# Patient Record
Sex: FEMALE | Race: WHITE
[De-identification: names, ages, dates, MRNs, and addresses within clinical notes are randomized per-mention and may not be internally consistent; named-entity substitution may affect disease eponyms.]

---

## 2018-01-23 ENCOUNTER — HOSPITAL ENCOUNTER (OUTPATIENT)
Dept: HOSPITAL 17 - HRAD | Age: 83
End: 2018-01-23
Payer: MEDICARE

## 2018-01-23 DIAGNOSIS — G21.9: Primary | ICD-10-CM

## 2018-01-23 PROCEDURE — A9584 IODINE I-123 IOFLUPANE: HCPCS

## 2018-01-23 PROCEDURE — 78607: CPT

## 2018-01-23 NOTE — RADRPT
EXAM DATE/TIME:  01/23/2018 12:54 

 

[facility] COMPARISON:     

No previous studies available for comparison.

 

 

INDICATIONS :     

Tremors.

                       

 

DOSE:      

4.8 mCi Ioflupane Iodine-123 in 2.5 ml total volume

                       

 

MEDICATION(S):      

130 mg Potasium Iodine PO one hour prior to injection

                       

 

SPECT IMAGING:      

3.5 Hrs.

                       

 

IMAGNG:                        

SPECT/CT imaging with fusion was performed.

                       

 

RADIATION DOSE:      

30.27 CTDIvol (mGy)

                       

 

MEDICAL HISTORY :     

Carcinoma, breast.   

 

SURGICAL HISTORY :      

Mastectomy, bilateral.  Hysterectomy. Cholecystectomy. 

 

ENCOUNTER:     

Initial

 

ACUITY:     

1 day

 

PAIN SCALE:     

0/10

 

LOCATION:        

Brain.

 

TECHNIQUE:     

SPECT imaging of the brain was performed in sagittal, axial and coronal planes. Attenuation correctio
n was performed with computed tomography and both the attenuation correction and non-attenuation vivian
ected data sets were reviewed. 

 

FINDINGS:      

There is normal biodistribution of radionuclide with symmetric crescent-shaped areas of activity are 
in the striatum which appears distinct relative to the surrounding brain tissue.

 

CONCLUSION:     Normal examination for a patient of this age.  

 

 

 

 Nish Blackwell MD on January 23, 2018 at 16:14           

Board Certified Radiologist.

 This report was verified electronically.